# Patient Record
Sex: FEMALE | Race: WHITE | Employment: OTHER | URBAN - NONMETROPOLITAN AREA
[De-identification: names, ages, dates, MRNs, and addresses within clinical notes are randomized per-mention and may not be internally consistent; named-entity substitution may affect disease eponyms.]

---

## 2023-09-18 ENCOUNTER — OFFICE VISIT (OUTPATIENT)
Age: 88
End: 2023-09-18
Payer: MEDICARE

## 2023-09-18 VITALS
DIASTOLIC BLOOD PRESSURE: 82 MMHG | OXYGEN SATURATION: 99 % | WEIGHT: 187 LBS | TEMPERATURE: 97.9 F | RESPIRATION RATE: 20 BRPM | SYSTOLIC BLOOD PRESSURE: 142 MMHG | HEART RATE: 75 BPM

## 2023-09-18 DIAGNOSIS — S49.92XA ARM INJURY, LEFT, INITIAL ENCOUNTER: ICD-10-CM

## 2023-09-18 DIAGNOSIS — S51.012A SKIN TEAR OF LEFT ELBOW WITHOUT COMPLICATION, INITIAL ENCOUNTER: Primary | ICD-10-CM

## 2023-09-18 PROCEDURE — 1123F ACP DISCUSS/DSCN MKR DOCD: CPT

## 2023-09-18 PROCEDURE — 99203 OFFICE O/P NEW LOW 30 MIN: CPT

## 2023-09-18 RX ORDER — OMEPRAZOLE 20 MG/1
20 CAPSULE, DELAYED RELEASE ORAL DAILY
COMMUNITY

## 2023-09-18 RX ORDER — LISINOPRIL 40 MG/1
40 TABLET ORAL DAILY
COMMUNITY

## 2023-09-18 RX ORDER — BRIMONIDINE/DORZOLAMIDE/PF 0.15 %-2 %
DROPS OPHTHALMIC (EYE)
COMMUNITY

## 2023-09-18 RX ORDER — AMLODIPINE BESYLATE 5 MG/1
5 TABLET ORAL DAILY
COMMUNITY

## 2023-09-18 RX ORDER — SIMVASTATIN 10 MG
10 TABLET ORAL NIGHTLY
COMMUNITY

## 2023-09-18 RX ORDER — NETARSUDIL AND LATANOPROST OPHTHALMIC SOLUTION, 0.02%/0.005% .2; .05 MG/ML; MG/ML
1 SOLUTION/ DROPS OPHTHALMIC; TOPICAL EVERY EVENING
COMMUNITY

## 2023-09-18 ASSESSMENT — ENCOUNTER SYMPTOMS
COLOR CHANGE: 0
NAUSEA: 0
CHEST TIGHTNESS: 0
ABDOMINAL PAIN: 0
APNEA: 0
STRIDOR: 0
SINUS PRESSURE: 0
VOMITING: 0
SHORTNESS OF BREATH: 0
DIARRHEA: 0
EYE DISCHARGE: 0
ABDOMINAL DISTENTION: 0
CHOKING: 0
CONSTIPATION: 0
WHEEZING: 0
COUGH: 0
SORE THROAT: 0
EYE PAIN: 0
SINUS PAIN: 0
EYE REDNESS: 0
TROUBLE SWALLOWING: 0
FACIAL SWELLING: 0

## 2023-09-18 NOTE — PATIENT INSTRUCTIONS
Wound was cleaned in office using hibiclens, bactroban applied, and the area was bandaged and wrapped. Xray ordered; will call with results. Mupirocin prescribed; apply twice daily after washing with antibacterial soap. Keep clean with antibacterial soap. Avoid touching or scratching the area. Tylenol/ibuprofen as needed. Follow up with pcp or return to clinic for recheck in 1 week. Follow up sooner if sign/symptoms worsening. Report to ER if symptoms become severe or systemic, such as fever/body aches/chills.

## 2023-09-18 NOTE — PROGRESS NOTES
(BACTROBAN) 2 % ointment      2. Arm injury, left, initial encounter  XR RADIUS ULNA LEFT (2 VIEWS)          Plan   Wound was cleaned in office using hibiclens, bactroban applied, and the area was bandaged and wrapped. Xray ordered; will call with results. Mupirocin prescribed; apply twice daily after washing with antibacterial soap. Pt was given a written prescription. Keep clean with antibacterial soap. Avoid touching or scratching the area. Tylenol/ibuprofen as needed. Follow up with pcp or return to clinic for recheck in 1 week. Follow up sooner if sign/symptoms worsening. Report to ER if symptoms become severe or systemic, such as fever/body aches/chills. Pt demonstrates understanding and agrees with treatment plan. Orders Placed This Encounter   Procedures    XR RADIUS ULNA LEFT (2 VIEWS)     Standing Status:   Future     Standing Expiration Date:   9/18/2024     Order Specific Question:   Reason for exam:     Answer:   left arm injury; left arm pain, bruising       No results found for this visit on 09/18/23. Orders Placed This Encounter   Medications    DISCONTD: mupirocin (BACTROBAN) 2 % ointment     Sig: Apply topically 3 times daily. Dispense:  30 g     Refill:  0    mupirocin (BACTROBAN) 2 % ointment     Sig: Apply topically 3 times daily. Dispense:  30 g     Refill:  0      New Prescriptions    MUPIROCIN (BACTROBAN) 2 % OINTMENT    Apply topically 3 times daily. Return if symptoms worsen or fail to improve. Discussed use, benefits, and side effects of any prescribed medications. All patient questions were answered. Patient voiced understanding of care plan. Patient was given educational materials - see patient instructions below. Patient Instructions   Wound was cleaned in office using hibiclens, bactroban applied, and the area was bandaged and wrapped. Xray ordered; will call with results.     Mupirocin prescribed; apply twice daily